# Patient Record
Sex: MALE | Race: WHITE | NOT HISPANIC OR LATINO
[De-identification: names, ages, dates, MRNs, and addresses within clinical notes are randomized per-mention and may not be internally consistent; named-entity substitution may affect disease eponyms.]

---

## 2017-02-24 ENCOUNTER — RX RENEWAL (OUTPATIENT)
Age: 57
End: 2017-02-24

## 2017-11-21 ENCOUNTER — RX RENEWAL (OUTPATIENT)
Age: 57
End: 2017-11-21

## 2018-08-20 ENCOUNTER — RX RENEWAL (OUTPATIENT)
Age: 58
End: 2018-08-20

## 2018-11-08 ENCOUNTER — OUTPATIENT (OUTPATIENT)
Dept: OUTPATIENT SERVICES | Facility: HOSPITAL | Age: 58
LOS: 1 days | End: 2018-11-08
Payer: COMMERCIAL

## 2018-11-08 PROCEDURE — 73630 X-RAY EXAM OF FOOT: CPT | Mod: 26,RT

## 2018-11-21 ENCOUNTER — OUTPATIENT (OUTPATIENT)
Dept: OUTPATIENT SERVICES | Facility: HOSPITAL | Age: 58
LOS: 1 days | End: 2018-11-21
Payer: COMMERCIAL

## 2018-11-21 PROCEDURE — 73630 X-RAY EXAM OF FOOT: CPT | Mod: 26,RT

## 2021-10-07 ENCOUNTER — APPOINTMENT (OUTPATIENT)
Dept: CT IMAGING | Facility: CLINIC | Age: 61
End: 2021-10-07
Payer: COMMERCIAL

## 2021-10-07 PROCEDURE — 82565 ASSAY OF CREATININE: CPT | Mod: QW

## 2021-10-07 PROCEDURE — 74177 CT ABD & PELVIS W/CONTRAST: CPT

## 2021-10-14 ENCOUNTER — APPOINTMENT (OUTPATIENT)
Dept: UROLOGY | Facility: CLINIC | Age: 61
End: 2021-10-14
Payer: COMMERCIAL

## 2021-10-14 ENCOUNTER — NON-APPOINTMENT (OUTPATIENT)
Age: 61
End: 2021-10-14

## 2021-10-14 VITALS
TEMPERATURE: 98.3 F | WEIGHT: 210 LBS | SYSTOLIC BLOOD PRESSURE: 137 MMHG | HEART RATE: 86 BPM | BODY MASS INDEX: 30.06 KG/M2 | HEIGHT: 70 IN | DIASTOLIC BLOOD PRESSURE: 91 MMHG

## 2021-10-14 LAB
BILIRUB UR QL STRIP: NEGATIVE
CLARITY UR: CLEAR
COLLECTION METHOD: NORMAL
GLUCOSE UR-MCNC: NEGATIVE
HCG UR QL: 0.2 EU/DL
HGB UR QL STRIP.AUTO: NEGATIVE
KETONES UR-MCNC: NEGATIVE
LEUKOCYTE ESTERASE UR QL STRIP: NEGATIVE
NITRITE UR QL STRIP: NEGATIVE
PH UR STRIP: 6
PROT UR STRIP-MCNC: NEGATIVE
SP GR UR STRIP: 1.01

## 2021-10-14 PROCEDURE — 51798 US URINE CAPACITY MEASURE: CPT

## 2021-10-14 PROCEDURE — 99204 OFFICE O/P NEW MOD 45 MIN: CPT

## 2021-10-14 NOTE — PHYSICAL EXAM
[General Appearance - Well Developed] : well developed [General Appearance - Well Nourished] : well nourished [Normal Appearance] : normal appearance [Well Groomed] : well groomed [General Appearance - In No Acute Distress] : no acute distress [Edema] : no peripheral edema [Respiration, Rhythm And Depth] : normal respiratory rhythm and effort [Exaggerated Use Of Accessory Muscles For Inspiration] : no accessory muscle use [Abdomen Soft] : soft [Abdomen Tenderness] : non-tender [Costovertebral Angle Tenderness] : no ~M costovertebral angle tenderness [Urethral Meatus] : meatus normal [Penis Abnormality] : normal circumcised penis [Urinary Bladder Findings] : the bladder was normal on palpation [Scrotum] : the scrotum was normal [Epididymis] : the epididymides were normal [Testes Tenderness] : no tenderness of the testes [Testes Mass (___cm)] : there were no testicular masses [Prostate Tenderness] : the prostate was not tender [No Prostate Nodules] : no prostate nodules [Prostate Size ___ gm] : prostate size [unfilled] gm [Normal Station and Gait] : the gait and station were normal for the patient's age [] : no rash [No Focal Deficits] : no focal deficits [Oriented To Time, Place, And Person] : oriented to person, place, and time [Affect] : the affect was normal [Mood] : the mood was normal [Not Anxious] : not anxious

## 2021-10-14 NOTE — LETTER BODY
[Dear  ___] : Dear  [unfilled], [Consult Letter:] : I had the pleasure of evaluating your patient, [unfilled]. [Please see my note below.] : Please see my note below. [Consult Closing:] : Thank you very much for allowing me to participate in the care of this patient.  If you have any questions, please do not hesitate to contact me. [Sincerely,] : Sincerely, [FreeTextEntry3] : Miguel Rivera, DO\par Genitourinary Medicine\par

## 2021-10-14 NOTE — REVIEW OF SYSTEMS
[Wake up at night to urinate  How many times?  ___] : wakes up to urinate [unfilled] times during the night [Strain or push to urinate] : strain or push to urinate [Wait a long time to urinate] : waits a long time to urinate [Negative] : Heme/Lymph [see HPI] : see HPI

## 2021-10-14 NOTE — HISTORY OF PRESENT ILLNESS
[FreeTextEntry1] : Mr. MIROSLAVA TAVARES 61 year old  M  PMH HTN, GERD and PSH neck surgery, umbilical hernia, tonsillectomy, right foot.  Pt come sin sent by PCP for burning on urination. and nocturia 2-3 weeks ago. Pt also had hesitancy. Pt was seen at PCP and given abx for a possible UTI. Pt urinating almost every hour. Pt has a history or urinary retention requiring a mayo catheter after surgery. This has happened about 3 times. Pt has tried flomax in the past before. Father had bladder cancer and colon cancer. Nonsmoker. \par \par \par \par 4/15/21 PSA 2.1

## 2021-10-18 LAB
APPEARANCE: CLEAR
BACTERIA UR CULT: NORMAL
BACTERIA: NEGATIVE
BILIRUBIN URINE: NEGATIVE
BLOOD URINE: NEGATIVE
COLOR: YELLOW
GLUCOSE QUALITATIVE U: NEGATIVE
HYALINE CASTS: 1 /LPF
KETONES URINE: NEGATIVE
LEUKOCYTE ESTERASE URINE: NEGATIVE
MICROSCOPIC-UA: NORMAL
NITRITE URINE: NEGATIVE
PH URINE: 6
PROTEIN URINE: NEGATIVE
RED BLOOD CELLS URINE: 0 /HPF
SPECIFIC GRAVITY URINE: 1.01
SQUAMOUS EPITHELIAL CELLS: 0 /HPF
UROBILINOGEN URINE: NORMAL
WHITE BLOOD CELLS URINE: 1 /HPF

## 2021-10-28 ENCOUNTER — APPOINTMENT (OUTPATIENT)
Dept: UROLOGY | Facility: CLINIC | Age: 61
End: 2021-10-28
Payer: COMMERCIAL

## 2021-10-28 VITALS
DIASTOLIC BLOOD PRESSURE: 85 MMHG | HEART RATE: 70 BPM | TEMPERATURE: 98 F | BODY MASS INDEX: 30.06 KG/M2 | HEIGHT: 70 IN | WEIGHT: 210 LBS | SYSTOLIC BLOOD PRESSURE: 124 MMHG

## 2021-10-28 PROCEDURE — 99213 OFFICE O/P EST LOW 20 MIN: CPT

## 2021-10-28 NOTE — HISTORY OF PRESENT ILLNESS
[FreeTextEntry1] : Pt comes in follow up LUTS. Happy with his urination on flomax. \par \par 4/15/21 PSA 2.1 \par \par PVR 4

## 2021-11-12 DIAGNOSIS — Z01.818 ENCOUNTER FOR OTHER PREPROCEDURAL EXAMINATION: ICD-10-CM

## 2021-11-14 ENCOUNTER — APPOINTMENT (OUTPATIENT)
Dept: DISASTER EMERGENCY | Facility: CLINIC | Age: 61
End: 2021-11-14

## 2021-11-15 LAB — SARS-COV-2 N GENE NPH QL NAA+PROBE: NOT DETECTED

## 2021-11-17 ENCOUNTER — OUTPATIENT (OUTPATIENT)
Dept: OUTPATIENT SERVICES | Facility: HOSPITAL | Age: 61
LOS: 1 days | End: 2021-11-17

## 2022-01-04 ENCOUNTER — TRANSCRIPTION ENCOUNTER (OUTPATIENT)
Age: 62
End: 2022-01-04

## 2022-03-07 ENCOUNTER — RX RENEWAL (OUTPATIENT)
Age: 62
End: 2022-03-07

## 2022-12-06 ENCOUNTER — APPOINTMENT (OUTPATIENT)
Dept: RADIOLOGY | Facility: CLINIC | Age: 62
End: 2022-12-06

## 2022-12-06 PROCEDURE — 71046 X-RAY EXAM CHEST 2 VIEWS: CPT

## 2023-03-23 ENCOUNTER — APPOINTMENT (OUTPATIENT)
Dept: UROLOGY | Facility: CLINIC | Age: 63
End: 2023-03-23
Payer: COMMERCIAL

## 2023-03-23 VITALS
BODY MASS INDEX: 30.78 KG/M2 | SYSTOLIC BLOOD PRESSURE: 143 MMHG | DIASTOLIC BLOOD PRESSURE: 81 MMHG | WEIGHT: 215 LBS | HEIGHT: 70 IN | TEMPERATURE: 97.3 F | HEART RATE: 93 BPM

## 2023-03-23 PROCEDURE — 99214 OFFICE O/P EST MOD 30 MIN: CPT

## 2023-03-23 NOTE — HISTORY OF PRESENT ILLNESS
[FreeTextEntry1] : Pt comes in follow up LUTS. Happy with his urination on flomax with occasional dribbling and difficulty urinating. Pt having running nose. This started a long time ago. Saw allergists and not allergies per pt. \par \par 4/15/21 PSA 2.1 \par 10/26/22 PSA 2.37\par \par

## 2023-03-28 ENCOUNTER — RX RENEWAL (OUTPATIENT)
Age: 63
End: 2023-03-28

## 2023-03-28 RX ORDER — TAMSULOSIN HYDROCHLORIDE 0.4 MG/1
0.4 CAPSULE ORAL
Qty: 90 | Refills: 3 | Status: ACTIVE | COMMUNITY
Start: 2021-10-14 | End: 1900-01-01

## 2023-03-29 ENCOUNTER — NON-APPOINTMENT (OUTPATIENT)
Age: 63
End: 2023-03-29

## 2023-04-27 ENCOUNTER — APPOINTMENT (OUTPATIENT)
Dept: UROLOGY | Facility: CLINIC | Age: 63
End: 2023-04-27
Payer: COMMERCIAL

## 2023-04-27 VITALS
DIASTOLIC BLOOD PRESSURE: 93 MMHG | BODY MASS INDEX: 32.07 KG/M2 | HEART RATE: 96 BPM | WEIGHT: 224 LBS | SYSTOLIC BLOOD PRESSURE: 147 MMHG | TEMPERATURE: 97.2 F | HEIGHT: 70 IN

## 2023-04-27 PROCEDURE — 99213 OFFICE O/P EST LOW 20 MIN: CPT

## 2023-04-27 RX ORDER — TADALAFIL 5 MG/1
5 TABLET ORAL
Qty: 90 | Refills: 3 | Status: ACTIVE | COMMUNITY
Start: 2023-04-27 | End: 1900-01-01

## 2023-04-27 NOTE — HISTORY OF PRESENT ILLNESS
[FreeTextEntry1] : Pt comes in follow up LUTS. pt feels cialis has helped and is happy with his urination.\par \par 4/15/21 PSA 2.1 \par 10/26/22 PSA 2.37\par \par

## 2023-11-16 ENCOUNTER — APPOINTMENT (OUTPATIENT)
Dept: UROLOGY | Facility: CLINIC | Age: 63
End: 2023-11-16
Payer: COMMERCIAL

## 2023-11-16 VITALS
HEIGHT: 70 IN | SYSTOLIC BLOOD PRESSURE: 128 MMHG | WEIGHT: 224 LBS | HEART RATE: 82 BPM | DIASTOLIC BLOOD PRESSURE: 71 MMHG | BODY MASS INDEX: 32.07 KG/M2 | TEMPERATURE: 97.3 F

## 2023-11-16 PROCEDURE — 99213 OFFICE O/P EST LOW 20 MIN: CPT

## 2023-11-16 RX ORDER — TADALAFIL 5 MG/1
5 TABLET ORAL
Qty: 90 | Refills: 3 | Status: ACTIVE | COMMUNITY
Start: 2023-03-23 | End: 1900-01-01

## 2023-11-22 LAB
APPEARANCE: CLEAR
BACTERIA UR CULT: NORMAL
BACTERIA: NEGATIVE /HPF
BILIRUBIN URINE: NEGATIVE
BLOOD URINE: NEGATIVE
CAST: 0 /LPF
COLOR: NORMAL
EPITHELIAL CELLS: 0 /HPF
GLUCOSE QUALITATIVE U: NEGATIVE MG/DL
KETONES URINE: NEGATIVE MG/DL
LEUKOCYTE ESTERASE URINE: NEGATIVE
MICROSCOPIC-UA: NORMAL
NITRITE URINE: NEGATIVE
PH URINE: 6
PROTEIN URINE: NORMAL MG/DL
RED BLOOD CELLS URINE: 0 /HPF
SPECIFIC GRAVITY URINE: 1.02
UROBILINOGEN URINE: 0.2 MG/DL
WHITE BLOOD CELLS URINE: 1 /HPF

## 2024-01-04 ENCOUNTER — APPOINTMENT (OUTPATIENT)
Dept: ULTRASOUND IMAGING | Facility: CLINIC | Age: 64
End: 2024-01-04
Payer: COMMERCIAL

## 2024-01-04 PROCEDURE — 76536 US EXAM OF HEAD AND NECK: CPT

## 2024-05-16 ENCOUNTER — APPOINTMENT (OUTPATIENT)
Dept: UROLOGY | Facility: CLINIC | Age: 64
End: 2024-05-16
Payer: COMMERCIAL

## 2024-05-16 VITALS
DIASTOLIC BLOOD PRESSURE: 77 MMHG | HEART RATE: 84 BPM | WEIGHT: 224 LBS | HEIGHT: 70 IN | SYSTOLIC BLOOD PRESSURE: 121 MMHG | TEMPERATURE: 97.2 F | BODY MASS INDEX: 32.07 KG/M2

## 2024-05-16 DIAGNOSIS — R39.9 UNSPECIFIED SYMPTOMS AND SIGNS INVOLVING THE GENITOURINARY SYSTEM: ICD-10-CM

## 2024-05-16 PROCEDURE — 99213 OFFICE O/P EST LOW 20 MIN: CPT

## 2024-05-16 NOTE — HISTORY OF PRESENT ILLNESS
[FreeTextEntry1] : Pt comes in follow up LUTS. pt feels cialis has helped and is happy with his urination.  4/15/21 PSA 2.1  10/26/22 PSA 2.37 11/13/23 PSA 2.16

## 2024-12-03 ENCOUNTER — RX RENEWAL (OUTPATIENT)
Age: 64
End: 2024-12-03

## 2024-12-10 ENCOUNTER — APPOINTMENT (OUTPATIENT)
Dept: UROLOGY | Facility: CLINIC | Age: 64
End: 2024-12-10
Payer: COMMERCIAL

## 2024-12-10 VITALS
DIASTOLIC BLOOD PRESSURE: 73 MMHG | HEIGHT: 70 IN | HEART RATE: 82 BPM | BODY MASS INDEX: 32.07 KG/M2 | SYSTOLIC BLOOD PRESSURE: 121 MMHG | WEIGHT: 224 LBS | TEMPERATURE: 98.2 F

## 2024-12-10 DIAGNOSIS — R39.9 UNSPECIFIED SYMPTOMS AND SIGNS INVOLVING THE GENITOURINARY SYSTEM: ICD-10-CM

## 2024-12-10 PROCEDURE — 99213 OFFICE O/P EST LOW 20 MIN: CPT

## 2025-01-13 ENCOUNTER — APPOINTMENT (OUTPATIENT)
Dept: ULTRASOUND IMAGING | Facility: CLINIC | Age: 65
End: 2025-01-13
Payer: COMMERCIAL

## 2025-01-13 PROCEDURE — 76536 US EXAM OF HEAD AND NECK: CPT

## 2025-06-10 ENCOUNTER — APPOINTMENT (OUTPATIENT)
Dept: UROLOGY | Facility: CLINIC | Age: 65
End: 2025-06-10